# Patient Record
(demographics unavailable — no encounter records)

---

## 2024-10-31 NOTE — PHYSICAL EXAM
[Midline] : trachea located in midline position [de-identified] : right nasal bone depressed deviated nasal bones c shaped [Normal] : no rashes

## 2024-10-31 NOTE — PHYSICAL EXAM
[Midline] : trachea located in midline position [de-identified] : right nasal bone depressed deviated nasal bones c shaped [Normal] : no rashes

## 2024-10-31 NOTE — HISTORY OF PRESENT ILLNESS
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  Denies bleeds following ED. Reports nasal obstruction, nasal congestion. She reports spitting up some mild bloody drainage from post nasal drip. Endorses constant headaches Some pain to touch, decrease in swelling since.  CT Maxillofacial 10/23/24 done in ED. Currently on Augmentin, denies recent fevers or infections.

## 2024-10-31 NOTE — HISTORY OF PRESENT ILLNESS
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  Denies bleeds following ED. Reports nasal obstruction, nasal congestion. She reports spitting up some mild bloody drainage from post nasal drip. Endorses constant headaches Some pain to touch, decrease in swelling since.  CT Maxillofacial 10/23/24 done in ED. Currently on Augmentin, denies recent fevers or infections.

## 2024-10-31 NOTE — ASSESSMENT
[FreeTextEntry1] : 15 year old female with mild VWD with nasal bone fractures, right nasal bone depressed causing deviation. We discussed closed reduction in the operating room setting given history of VWD. To obtain hematology clearance.

## 2024-11-06 NOTE — REASON FOR VISIT
[de-identified] : closed reduction of nasal fracture  [de-identified] : 10/25/24 [de-identified] : 2 [de-identified] : 15 year old female with history of nasal trauma with subsequent fracture. s/p closed reduction of nasal fracture.  Presents for post op evaluation

## 2024-11-06 NOTE — ASSESSMENT
[FreeTextEntry1] : 15 year old female with nasal fracture sp closed reduction in the OR. Discussed taping nose for 1 week then no taping. Return in 3 mo.

## 2025-01-27 NOTE — PROCEDURE
[FreeTextEntry1] : nasal endoscopy [FreeTextEntry2] : sinus congestion [FreeTextEntry3] : Procedure: nasal endoscopy   Pre-operative diagnosis:   Indication: Anterior rhinoscopy insufficient to diagnose pathology  Details: After decongestant and lidocaine was sprayed in the bilateral nasal cavities, a flexible laryngoscope was inserted into the right nares. The nasal cavity, middle meatus, ETO, nasopharynx, and glottis were visualized. The endoscope was then inserted into the left nares and the nasal cavity, middle meatus, and ETO was visualized. The patient tolerated procedure well.  Findings: right septal deviation mild BITH

## 2025-01-27 NOTE — HISTORY OF PRESENT ILLNESS
[de-identified] : 15 year old girl with h/o nasal fracture s/p Closed reduction of nasal fracture 10/30/2024 presents for follow-up. Reports her nose occasionally feels stiff and sore.  Complains of frequent nasal congestion, severe sinus pressure and post nasal drip mainly in the morning,  Denies anterior rhinorrhea, epistaxis, recent fevers or sinus infections.  Denies use of OTC allergy medication or nasal sprays.  No recent imaging.  Sense of smell varies, either extremely sensitive or she can not smell at all at times.

## 2025-01-27 NOTE — ASSESSMENT
[FreeTextEntry1] : 15 year old female with history of nasal fracture sp closed reduction 10/31 - we discussed nss and flonase bid. Mild deviated septum but will monitor at this point.